# Patient Record
Sex: MALE | Race: WHITE | NOT HISPANIC OR LATINO | Employment: FULL TIME
[De-identification: names, ages, dates, MRNs, and addresses within clinical notes are randomized per-mention and may not be internally consistent; named-entity substitution may affect disease eponyms.]

---

## 2023-11-20 ENCOUNTER — OFFICE VISIT (OUTPATIENT)
Dept: MEDICAL GROUP | Facility: CLINIC | Age: 48
End: 2023-11-20
Payer: COMMERCIAL

## 2023-11-20 VITALS
BODY MASS INDEX: 23.48 KG/M2 | WEIGHT: 164 LBS | RESPIRATION RATE: 14 BRPM | DIASTOLIC BLOOD PRESSURE: 95 MMHG | OXYGEN SATURATION: 98 % | SYSTOLIC BLOOD PRESSURE: 130 MMHG | HEART RATE: 71 BPM | TEMPERATURE: 96.8 F | HEIGHT: 70 IN

## 2023-11-20 DIAGNOSIS — B35.1 FUNGAL INFECTION OF TOENAIL: ICD-10-CM

## 2023-11-20 DIAGNOSIS — Z13.9 DUE FOR SCREENING: ICD-10-CM

## 2023-11-20 DIAGNOSIS — Z12.12 SCREENING FOR COLORECTAL CANCER: ICD-10-CM

## 2023-11-20 DIAGNOSIS — Z12.11 SCREENING FOR COLORECTAL CANCER: ICD-10-CM

## 2023-11-20 PROCEDURE — 3080F DIAST BP >= 90 MM HG: CPT

## 2023-11-20 PROCEDURE — 3075F SYST BP GE 130 - 139MM HG: CPT

## 2023-11-20 PROCEDURE — 99213 OFFICE O/P EST LOW 20 MIN: CPT | Mod: GE

## 2023-11-20 ASSESSMENT — PATIENT HEALTH QUESTIONNAIRE - PHQ9: CLINICAL INTERPRETATION OF PHQ2 SCORE: 0

## 2023-11-20 NOTE — PROGRESS NOTES
[facility] MEDICINE     PATIENT ID:  NAME:  Evette Montero  MRN:               [id number]  YOB: 1975    Date: 10:55 AM      Resident: Malachi Castellanos M.D.    CC: [facility]      HPI: Evette Montero is a 48 y.o. male who presented to [facility].  The patient states that he has not been seen by primary care physician in approximately 4 years.  He states that in the past he went to [place] as he lived in [facility] [facility] [place] but has recently relocated to the area.  He is employed as a  at [employer].  He notes a family history of dyslipidemia and 1 brother who passed away from lymphoma in his 30s but otherwise states he has been very healthy.  He does note a chronic fungal infection of the toenails which he would like to address.  He also states she is interested in colorectal cancer screening.  Patient notes a mild history of recreational marijuana use but states that he quit this approximately 4 months ago.  Denies any tobacco use.  Denies any excessive alcohol.    No problems updated.    REVIEW OF SYSTEMS:   Ten systems reviewed and were negative except as noted in the HPI.                PROBLEM LIST  There is no problem list on file for this patient.       PAST SURGICAL HISTORY:  Past Surgical History:   Procedure Laterality Date    HERNIA REPAIR Bilateral     Groin       FAMILY HISTORY:  Family History   Problem Relation Age of Onset    Hypertension Mother     Hyperlipidemia Mother    Family history of lymphoma in brother  Family history of cousin with brain cancer    SOCIAL HISTORY:   Social History     Tobacco Use    Smoking status: Never    Smokeless tobacco: Never   Substance Use Topics    Alcohol use: Yes     Alcohol/week: 1.2 - 3.6 oz     Types: 2 - 6 Cans of beer per week     Comment: social       ALLERGIES:  Not on File    OUTPATIENT MEDICATIONS:  No current outpatient medications on file.    PHYSICAL EXAM:  Vitals:    11/20/23 1035   BP: (!) 130/95   BP Location: Left  "arm   Patient Position: Sitting   BP Cuff Size: Adult   Pulse: 71   Resp: 14   Temp: 36 °C (96.8 °F)   TempSrc: Temporal   SpO2: 98%   Weight: 74.4 kg (164 lb)   Height: 1.778 m (5' 10\")       General: Pt resting in NAD, pleasant and cooperative   Skin:  Pink, warm and dry.  HEENT: NC/AT. EOMI. no lymphadenopathy, no tenderness, no rashes  Lungs:  Symmetrical.  CTAB, good air movement, no adventitious sounds  Cardiovascular:  S1/S2 RRR, no murmurs rubs or gallops  Abdomen:  Abdomen is soft, nontender  Extremities:  Full range of motion.  CNS:  Muscle tone is normal. No gross focal neurologic deficits      ASSESSMENT/PLAN:   48 y.o. male who presents to establish care.  Notes a family history of dyslipidemia as well as a brother who passed away of lymphoma in his 30s.  Otherwise patient states he is at his baseline state of health and feels well.  Reports no medication use.    Colon cancer screening: We will provide a referral to GI for colonoscopy at this time.    We will provide lab work for establishing care.  We will order lipid profile, CMP and TSH to further evaluate patient's metabolic status given family history of dyslipidemia.  We will also order for HIV and hep C as patient has never been screened in the past and is interested.    We will also provide patient with referral to [facility].    Patient does note chronic fungal infection of the toenail.  Counseled patient on onychomycosis and advised him we can discuss treatment options after evaluation of his CMP.    Patient will plan to follow-up in the next 1 to 3 months after he has his lab work done for further discussion of treatment of onychomycosis and ASCVD risk.    Problem List Items Addressed This Visit    None  Visit Diagnoses       Screening for colorectal cancer        Relevant Orders    Referral to GI for Colonoscopy    Due for screening        Relevant Orders    Lipid Profile    Comp Metabolic Panel    TSH WITH REFLEX TO FT4    HIV " AG/AB COMBO ASSAY SCREENING    HEP C VIRUS ANTIBODY    Referral to Genetic Research Studies    Fungal infection of toenail                Malachi Castellanos M.D.  PGY-3  [facility]

## 2023-11-20 NOTE — LETTER
[id number]  Malachi Castellanos M.D.  [address]  [place] [address]  Fax: [phone]   Authorization for Release/Disclosure of   Protected Health Information   Name: EVETTE JAMISON : 1975 SSN: [id number]   Address: [address]  [place] [address] Phone:    There are no phone numbers on file.   I authorize the entity listed below to release/disclose the PHI below to:   [facility]/Malachi Castellanos M.D. and Malachi Castellanos M.D.   Provider or Entity Name:     Address   City, State, Zip   Phone:      Fax:     Reason for request: continuity of care   Information to be released:    [  ] LAST COLONOSCOPY,  including any PATH REPORT and follow-up  [  ] LAST FIT/COLOGUARD RESULT [  ] LAST DEXA  [  ] LAST MAMMOGRAM  [  ] LAST PAP  [  ] LAST LABS [  ] RETINA EXAM REPORT  [  ] IMMUNIZATION RECORDS  [  ] Release all info      [  ] Check here and initial the line next to each item to release ALL health information INCLUDING  _____ Care and treatment for drug and / or alcohol abuse  _____ HIV testing, infection status, or AIDS  _____ Genetic Testing    DATES OF SERVICE OR TIME PERIOD TO BE DISCLOSED: _____________  I understand and acknowledge that:  * This Authorization may be revoked at any time by you in writing, except if your health information has already been used or disclosed.  * Your health information that will be used or disclosed as a result of you signing this authorization could be re-disclosed by the recipient. If this occurs, your re-disclosed health information may no longer be protected by State or Federal laws.  * You may refuse to sign this Authorization. Your refusal will not affect your ability to obtain treatment.  * This Authorization becomes effective upon signing and will  on (date) __________.      If no date is indicated, this Authorization will  one (1) year from the signature date.    Name: Evette Jamison  Signature: Date:   2023     PLEASE FAX REQUESTED  RECORDS BACK TO: [phone]

## 2024-01-04 ENCOUNTER — OFFICE VISIT (OUTPATIENT)
Dept: URGENT CARE | Facility: CLINIC | Age: 49
End: 2024-01-04
Payer: COMMERCIAL

## 2024-01-04 VITALS
HEIGHT: 70 IN | OXYGEN SATURATION: 95 % | BODY MASS INDEX: 25.05 KG/M2 | HEART RATE: 85 BPM | TEMPERATURE: 97.8 F | WEIGHT: 175 LBS | DIASTOLIC BLOOD PRESSURE: 76 MMHG | SYSTOLIC BLOOD PRESSURE: 124 MMHG | RESPIRATION RATE: 14 BRPM

## 2024-01-04 DIAGNOSIS — L24.9 IRRITANT CONTACT DERMATITIS, UNSPECIFIED TRIGGER: Primary | ICD-10-CM

## 2024-01-04 PROCEDURE — 3078F DIAST BP <80 MM HG: CPT | Performed by: PHYSICIAN ASSISTANT

## 2024-01-04 PROCEDURE — 3074F SYST BP LT 130 MM HG: CPT | Performed by: PHYSICIAN ASSISTANT

## 2024-01-04 PROCEDURE — 99203 OFFICE O/P NEW LOW 30 MIN: CPT | Performed by: PHYSICIAN ASSISTANT

## 2024-01-04 RX ORDER — DOXYCYCLINE HYCLATE 100 MG
100 TABLET ORAL 2 TIMES DAILY
Qty: 14 TABLET | Refills: 0 | Status: SHIPPED | OUTPATIENT
Start: 2024-01-04 | End: 2024-01-11

## 2024-01-04 RX ORDER — CLOBETASOL PROPIONATE 0.5 MG/G
CREAM TOPICAL
Qty: 60 G | Refills: 1 | Status: SHIPPED | OUTPATIENT
Start: 2024-01-04

## 2024-01-04 RX ORDER — PREDNISONE 20 MG/1
40 TABLET ORAL DAILY
Qty: 12 TABLET | Refills: 0 | Status: SHIPPED | OUTPATIENT
Start: 2024-01-04 | End: 2024-01-10

## 2024-01-04 NOTE — PROGRESS NOTES
"Subjective:   Evette Montero is a 48 y.o. male who presents for Rash (Alec , redness and irritation bilateral hands and now going up through arms  x 2 months /)      HPI  The patient presents to the Urgent Care with complaints of a rash onset 2 months ago.  Rash started 2 #2 fingers on his right hand that over the last 2 to 3 days worsened and spread to bilateral hands, fingers, and arms.  Rash is itchy mostly at nighttime.  Does keep him up at night sometimes due to the itching.  Unsure of the cause.  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  Uses an axe soap which is somewhat new.  No other new skin contacts.  Otherwise he feels fine.  No other allergies. He has good hygiene.   Denies any fever, chills, headaches, muscle aches.       No past medical history on file.  No Known Allergies     Objective:     /76 (BP Location: Left arm, Patient Position: Sitting, BP Cuff Size: Adult)   Pulse 85   Temp 36.6 °C (97.8 °F) (Temporal)   Resp 14   Ht 1.778 m (5' 10\")   Wt 79.4 kg (175 lb)   SpO2 95%   BMI 25.11 kg/m²     Physical Exam  Vitals reviewed.   Constitutional:       General: He is not in acute distress.     Appearance: Normal appearance. He is not ill-appearing or toxic-appearing.   Eyes:      Conjunctiva/sclera: Conjunctivae normal.   Cardiovascular:      Rate and Rhythm: Normal rate.   Pulmonary:      Effort: Pulmonary effort is normal.   Musculoskeletal:      Cervical back: Neck supple.   Skin:     General: Skin is warm and dry.      Findings: Rash present.      Comments: Scattered erythematous dry scaly papular rash to bilateral hands and fingers moreso to dorsal side of hands and fingers. Rash spreads diffusely to forearms and mildly to upper arms. Some yellow crusts to top of rash. No discharge. No burrows.    Neurological:      General: No focal deficit present.      Mental Status: He is alert and oriented " to person, place, and time.   Psychiatric:         Mood and Affect: Mood normal.         Behavior: Behavior normal.         Diagnosis and associated orders:     1. Irritant contact dermatitis, unspecified trigger  - clobetasol (TEMOVATE) 0.05 % Cream; Apply to affected areas twice daily for no longer than 14 days  Dispense: 60 g; Refill: 1  - predniSONE (DELTASONE) 20 MG Tab; Take 2 Tablets by mouth every day for 6 days.  Dispense: 12 Tablet; Refill: 0  - Referral to Dermatology  - doxycycline (VIBRAMYCIN) 100 MG Tab; Take 1 Tablet by mouth 2 times a day for 7 days.  Dispense: 14 Tablet; Refill: 0       Comments/MDM:     Patient's presenting symptoms and exam findings consistent with irritant contact dermatitis.  Discussed possible secondary overlying staph infection.  Treatment plan is clobetasol cream.  Prednisone.  Doxycycline.  Avoid any known triggers.  Keep skin hydrated with fragrance free lotion such as Cetaphil or CeraVe.  Wear rubber gloves sparingly while at work while handling chemicals.  Take off rubber gloves, dry, wash, and apply lotion.  Zyrtec during the day and Benadryl at night.  Will place referral to dermatology.       I personally reviewed prior external notes and test results pertinent to today's visit. Pathogenesis of diagnosis discussed including typical length and natural progression. Supportive care, natural history, differential diagnoses, and indications for immediate follow-up discussed. Patient expresses understanding and agrees to plan. Patient denies any other questions or concerns.     Follow-up with the primary care physician for recheck, reevaluation, and consideration of further management.    Please note that this dictation was created using voice recognition software. I have made a reasonable attempt to correct obvious errors, but I expect that there are errors of grammar and possibly content that I did not discover before finalizing the note.    This note was electronically  signed by Fish Dacosta PA-C

## 2024-08-08 ENCOUNTER — OFFICE VISIT (OUTPATIENT)
Dept: URGENT CARE | Facility: CLINIC | Age: 49
End: 2024-08-08
Payer: COMMERCIAL

## 2024-08-08 VITALS
BODY MASS INDEX: 24.97 KG/M2 | TEMPERATURE: 98.6 F | WEIGHT: 174 LBS | HEART RATE: 68 BPM | RESPIRATION RATE: 16 BRPM | OXYGEN SATURATION: 97 % | DIASTOLIC BLOOD PRESSURE: 82 MMHG | SYSTOLIC BLOOD PRESSURE: 130 MMHG

## 2024-08-08 DIAGNOSIS — R21 RASH: ICD-10-CM

## 2024-08-08 PROCEDURE — 3075F SYST BP GE 130 - 139MM HG: CPT

## 2024-08-08 PROCEDURE — 99214 OFFICE O/P EST MOD 30 MIN: CPT

## 2024-08-08 PROCEDURE — 3079F DIAST BP 80-89 MM HG: CPT

## 2024-08-08 RX ORDER — PREDNISONE 10 MG/1
TABLET ORAL
Qty: 45 TABLET | Refills: 0 | Status: SHIPPED | OUTPATIENT
Start: 2024-08-08

## 2024-08-08 RX ORDER — HYDROXYZINE HYDROCHLORIDE 25 MG/1
50 TABLET, FILM COATED ORAL
Qty: 30 TABLET | Refills: 1 | Status: SHIPPED | OUTPATIENT
Start: 2024-08-08 | End: 2024-09-07

## 2024-08-09 NOTE — PROGRESS NOTES
Subjective:   Evette Montero is a 49 y.o. male who presents for Rash (BL Hands and Thighs and legs )      HPI:    Patient presents to urgent care with concerns of allergic reaction and rash  Rash involves bilateral arms, legs, stomach, and back  Has been present for a couple of weeks  Rash first appeared on hand and has spread to the other parts of his body  Rash is pruritic not painful  Not associate with fever, arthralgias, URI symptoms, or other recent viral syndromes  Interventions today: Clobetasol propionate, Aquaphor and Vaseline, mildly responsive  States he has a documented allergy to rubber. He has been using a chemical which has a rubber product in it. This tends to trigger a rash for him.  He is established with dermatology and an allergist for this same rash  Patient denies new medications, changes in medications  Patient denies insect bites.     ROS As above in HPI    Medications:    Current Outpatient Medications on File Prior to Visit   Medication Sig Dispense Refill    clobetasol (TEMOVATE) 0.05 % Cream Apply to affected areas twice daily for no longer than 14 days 60 g 1     No current facility-administered medications on file prior to visit.        Allergies:   Patient has no known allergies.    Problem List:   There is no problem list on file for this patient.       Surgical History:  Past Surgical History:   Procedure Laterality Date    HERNIA REPAIR Bilateral     Groin       Past Social Hx:   Social History     Tobacco Use    Smoking status: Never    Smokeless tobacco: Never   Vaping Use    Vaping status: Former    Substances: THC    Devices: Disposable   Substance Use Topics    Alcohol use: Yes     Alcohol/week: 1.2 - 3.6 oz     Types: 2 - 6 Cans of beer per week     Comment: social    Drug use: Not Currently     Types: Marijuana          Problem list, medications, and allergies reviewed by myself today in Epic.     Objective:     /82 (BP Location: Left arm, Patient Position: Sitting, BP  Cuff Size: Large adult)   Pulse 68   Temp 37 °C (98.6 °F) (Temporal)   Resp 16   Wt 78.9 kg (174 lb)   SpO2 97%   BMI 24.97 kg/m²     Physical Exam  Vitals and nursing note reviewed.   Constitutional:       Appearance: Normal appearance.   Cardiovascular:      Rate and Rhythm: Normal rate and regular rhythm.      Heart sounds: Normal heart sounds.   Pulmonary:      Effort: Pulmonary effort is normal.      Breath sounds: Normal breath sounds.   Skin:     Findings: Rash present.      Comments: Erythematous plaques on bilateral hand, forearms, bilateral thighs, lower legs, anterior and posterior trunk  No excoriation. There is fissuring dorsum of hands.  No surrounding erythema, edema, warmth, fluctuance  Not tender to palpation    Neurological:      Mental Status: He is alert.       Assessment/Plan:     Diagnosis and associated orders:   1. Rash  - predniSONE (DELTASONE) 10 MG Tab; Take 50mg days 1-3, 40mg days 4-6, 30mg days 7-9, 20mg days 10-12, 10mg days 13-15 by mouth with food.  Dispense: 45 Tablet; Refill: 0  - hydrOXYzine HCl (ATARAX) 25 MG Tab; Take 2 Tablets by mouth at bedtime as needed for Itching (allergies) for up to 30 days.  Dispense: 30 Tablet; Refill: 1        Comments/MDM:     Widespread eczema like presentation. Will start him on OCS.  Trim nails, minimize scratching  Currently no evidence of superimposed infection.  [place] use of unscented emollients  Avoid hot water  Antihistamines, topical calamine or benadryl prn pruritus  Return if condition worsens or fails to improve   Follow up with PCP and dermatology advised     Return to clinic or go to ED if symptoms worsen or persist. Indications for ED discussed at length. Patient/Parent/Guardian voices understanding. Follow-up with your primary care provider in 3-5 days. Red flag symptoms discussed. All side effects of medication discussed including allergic response, GI upset, tendon injury, rash, sedation etc.    Please note that this  dictation was created using voice recognition software. I have made a reasonable attempt to correct obvious errors, but I expect that there are errors of grammar and possibly content that I did not discover before finalizing the note.    This note was electronically signed by JEREL Page

## 2025-06-23 ENCOUNTER — OFFICE VISIT (OUTPATIENT)
Dept: INTERNAL MEDICINE | Facility: OTHER | Age: 50
End: 2025-06-23
Payer: COMMERCIAL

## 2025-06-23 VITALS
HEIGHT: 70 IN | BODY MASS INDEX: 25.54 KG/M2 | TEMPERATURE: 97.7 F | SYSTOLIC BLOOD PRESSURE: 112 MMHG | DIASTOLIC BLOOD PRESSURE: 73 MMHG | OXYGEN SATURATION: 96 % | WEIGHT: 178.4 LBS | HEART RATE: 72 BPM

## 2025-06-23 DIAGNOSIS — Z11.59 NEED FOR HEPATITIS C SCREENING TEST: ICD-10-CM

## 2025-06-23 DIAGNOSIS — Z91.89 AT RISK FOR SEXUALLY TRANSMITTED DISEASE DUE TO UNPROTECTED SEX: ICD-10-CM

## 2025-06-23 DIAGNOSIS — Z11.4 ENCOUNTER FOR SCREENING FOR HIV: Primary | ICD-10-CM

## 2025-06-23 DIAGNOSIS — Z23 PNEUMOCOCCAL VACCINE ADMINISTERED: ICD-10-CM

## 2025-06-23 PROBLEM — Z87.898 HISTORY OF PREDIABETES: Status: ACTIVE | Noted: 2025-06-23

## 2025-06-23 PROBLEM — L25.9 CONTACT DERMATITIS AND ECZEMA: Status: ACTIVE | Noted: 2025-06-23

## 2025-06-23 PROCEDURE — 90677 PCV20 VACCINE IM: CPT | Performed by: INTERNAL MEDICINE

## 2025-06-23 PROCEDURE — 99213 OFFICE O/P EST LOW 20 MIN: CPT | Mod: 25,GC | Performed by: INTERNAL MEDICINE

## 2025-06-23 PROCEDURE — 90471 IMMUNIZATION ADMIN: CPT | Performed by: INTERNAL MEDICINE

## 2025-06-23 PROCEDURE — 3078F DIAST BP <80 MM HG: CPT | Mod: GC | Performed by: INTERNAL MEDICINE

## 2025-06-23 PROCEDURE — 3074F SYST BP LT 130 MM HG: CPT | Mod: GC | Performed by: INTERNAL MEDICINE

## 2025-06-23 ASSESSMENT — PATIENT HEALTH QUESTIONNAIRE - PHQ9: CLINICAL INTERPRETATION OF PHQ2 SCORE: 0

## 2025-06-23 NOTE — PROGRESS NOTES
Evette Montero is a 50 y.o. male who presents today with the following:    CC: Establish Care with Primary Care Physician Miguel Dill    HPI:       its a 49 yo male who came to clinic for evaluation.    The patient mentioned that he is initially establish on a clinic with family medicine team that he came because he would like to consider STIs, patient states that he recently had unprotected sex, patient said that he has been  recently he has been having different encounters.  The patient denies any symptoms like fever, chills, nausea, vomiting, and denies any physical abnormality, secretions.    It was advised the patient to have protected sex in the future to prevent any STI, he was provided with a copy of safe sex.    After discussion with the patient he said he will go back to the clinic and reestablish with [facility] family medicine to get the rest of the blood work, it was mentioned to the patient he has overweight with a BMI of 25 but he states he does lifestyle changes like exercise as he works as a , reason why we are not sending any metabolic labs at this point.      ROS:       General: No fevers, chills, night sweats, weight loss or gain  HEENT: No hearing changes, vision changes, eye pain, ear pain, nasal discharge, sore throat  Neck: No swelling in neck  Pulmonary: No shortness of breath, cough, sputum, or hemoptysis  Cardiovascular: No chest pain, palpitations, or LE swelling  GI: No nausea, vomiting, diarrhea, constipation, abdominal pain, hematochezia or melena  : No dysuria or frequency  Neuro: No focal weakness, no general weakness, no headaches, no lightheadedness, no dizziness  Psych: No anxiety or depression    Past Medical History[1]    Past Surgical History[2]    Family History   Problem Relation Age of Onset    Hypertension Mother     Hyperlipidemia Mother        Social History[3]      Current Medications[4]    Physical Exam:  /73 (BP Location: Left  "arm, Patient Position: Sitting, BP Cuff Size: Adult)   Pulse 72   Temp 36.5 °C (97.7 °F) (Temporal)   Ht 1.778 m (5' 10\")   Wt 80.9 kg (178 lb 6.4 oz)   SpO2 96%   BMI 25.60 kg/m²   General: Well developed, well nourished male, in no distress.  HEENT: NC/AT, PERRL, EOMI, no scleral icterus or conjunctival pallor, fair dentition/denture in, no nasal discharge or oral erythema or exudates.   Neck: Supple, No cervical or supraclavicular LAD  CV:RRR, no murmurs gallops or Rubs, no JVD  Pulm: LCAB, no crackles, rales, rhonchi, or wheezing  GI: Normal bowel sounds, abdomen soft, nontender, nondistended to deep or light palpation in all 4 quadrants, no HSM.  MSK: Radial and dorsalis pedis pulses 2+ and equal bilaterally, respectively.  Strength 5 out of 5 in upper and lower extremities.  No lower extremity edema  Neuro: Patient is alert and oriented x3, no focal deficits  Psych: Appropriate mood and affect        Assessment and Plan:       1. At risk for sexually transmitted disease due to unprotected sex  2. Encounter for screening for HIV  3. Need for hepatitis C screening test  Patient states that he had unprotected sex recently reason why we will screen for STI panel, HIV and hep C, patient does not refer any symptoms or abnormalities.  - HIV AG/AB COMBO ASSAY SCREENING; Future  - HEP C VIRUS ANTIBODY; Future  - Chlamydia/GC, PCR (Urine); Future  - T.Pallidum AB PEGGY (Screening); Future  - Trichomonas Vaginalis by TMA; Future  - HEP B Surface Antibody; Future  - Hep B Core AB Total; Future  - Hep B Surface Antigen; Future  - Education provide Marcie 3 minutes and was also given a copy with safe sex recommendations.    4. Pneumococcal vaccine administered  It was offered to the patient pneumococcal vaccine, which he agreed and was applied during this visit.  - Pneumococcal Conjugate Vaccine 20-Valent (6 wks+)      Signed by: Miguel Dill M.D.      Miguel Dill M.D. PGY 2  [facility] [employer] " [facility]       [1] No past medical history on file.  [2]   Past Surgical History:  Procedure Laterality Date    HERNIA REPAIR Bilateral     Groin   [3]   Social History  Tobacco Use    Smoking status: Never    Smokeless tobacco: Never   Vaping Use    Vaping status: Former    Substances: THC    Devices: Disposable   Substance Use Topics    Alcohol use: Yes     Alcohol/week: 1.2 - 3.6 oz     Types: 2 - 6 Cans of beer per week     Comment: social    Drug use: Not Currently     Types: Marijuana   [4]   Current Outpatient Medications   Medication Sig Dispense Refill    clobetasol (TEMOVATE) 0.05 % Cream Apply to affected areas twice daily for no longer than 14 days 60 g 1     No current facility-administered medications for this visit.